# Patient Record
Sex: FEMALE | Race: BLACK OR AFRICAN AMERICAN | NOT HISPANIC OR LATINO | Employment: STUDENT | URBAN - METROPOLITAN AREA
[De-identification: names, ages, dates, MRNs, and addresses within clinical notes are randomized per-mention and may not be internally consistent; named-entity substitution may affect disease eponyms.]

---
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
## 2024-04-15 ENCOUNTER — PATIENT MESSAGE (OUTPATIENT)
Dept: PEDIATRICS | Facility: CLINIC | Age: 19
End: 2024-04-15
Payer: MEDICAID

## 2025-06-26 ENCOUNTER — OFFICE VISIT (OUTPATIENT)
Dept: URGENT CARE | Facility: CLINIC | Age: 20
End: 2025-06-26
Payer: MEDICAID

## 2025-06-26 VITALS
BODY MASS INDEX: 36.82 KG/M2 | SYSTOLIC BLOOD PRESSURE: 99 MMHG | HEIGHT: 65 IN | TEMPERATURE: 97 F | WEIGHT: 221 LBS | OXYGEN SATURATION: 99 % | DIASTOLIC BLOOD PRESSURE: 65 MMHG | HEART RATE: 78 BPM | RESPIRATION RATE: 17 BRPM

## 2025-06-26 DIAGNOSIS — L60.0 INGROWN TOENAIL OF RIGHT FOOT: Primary | ICD-10-CM

## 2025-06-26 PROCEDURE — 99204 OFFICE O/P NEW MOD 45 MIN: CPT | Mod: S$GLB,,, | Performed by: NURSE PRACTITIONER

## 2025-06-26 RX ORDER — CEPHALEXIN 500 MG/1
500 CAPSULE ORAL EVERY 12 HOURS
Qty: 14 CAPSULE | Refills: 0 | Status: SHIPPED | OUTPATIENT
Start: 2025-06-26 | End: 2025-07-03

## 2025-06-26 RX ORDER — TOBRAMYCIN 3 MG/ML
SOLUTION/ DROPS OPHTHALMIC
Qty: 5 ML | Refills: 0 | Status: SHIPPED | OUTPATIENT
Start: 2025-06-26

## 2025-06-26 NOTE — PROGRESS NOTES
"Subjective:      Patient ID: Johanna Huntley is a 20 y.o. female.    Vitals:  height is 5' 5" (1.651 m) and weight is 100.2 kg (221 lb). Her temperature is 97.1 °F (36.2 °C). Her blood pressure is 99/65 and her pulse is 78. Her respiration is 17 and oxygen saturation is 99%.     Chief Complaint: Toe Pain    Pt is here for toe pain that onset Monday. Pt states pain is 7/10. She has tried ibuprofen. Pt states it was caused by hitting her toe.     Provider note begins below    Patient reports right great toe hurting on Sunday.  She cut down the toenail on Tuesday.  She has had purulent discharge and pain since then.  She is also requesting some assistance with her ADHD medicine Vyvanse.  The pharmacy has been out of it.    Toe Pain   The incident occurred 3 to 5 days ago. The incident occurred at home. The injury mechanism was a direct blow. The pain is at a severity of 7/10. The pain has been Constant since onset. Associated symptoms include an inability to bear weight. Associated symptoms comments: Redness and swelling. . She reports no foreign bodies present. The symptoms are aggravated by palpation and weight bearing. Treatments tried: ibuprofen.     ROS   Objective:     Physical Exam   Constitutional: She is oriented to person, place, and time.   HENT:   Head: Normocephalic and atraumatic.   Cardiovascular: Normal rate.   Pulmonary/Chest: Effort normal. No respiratory distress.   Abdominal: Normal appearance.   Musculoskeletal:      Right foot: Right great toe: Exhibits tenderness and swelling.        Feet:    Neurological: She is alert and oriented to person, place, and time.   Skin: Skin is warm and dry.   Psychiatric: Her behavior is normal. Mood normal.           Assessment:     1. Ingrown toenail of right foot        Plan:   Unable to fill medication at this time  Recommend transferring medication to a different pharmacy   Tobramycin and nail bed   Warm Epson salt soaks   Keflex   Follow up with " podiatry          Ingrown toenail of right foot  -     tobramycin sulfate 0.3% (TOBREX) 0.3 % ophthalmic solution; 1 drop to right nailbed 2 x per day for 7 days  Dispense: 5 mL; Refill: 0  -     cephALEXin (KEFLEX) 500 MG capsule; Take 1 capsule (500 mg total) by mouth every 12 (twelve) hours. for 7 days  Dispense: 14 capsule; Refill: 0  -     Ambulatory referral/consult to Podiatry

## 2025-07-02 ENCOUNTER — TELEPHONE (OUTPATIENT)
Dept: PODIATRY | Facility: CLINIC | Age: 20
End: 2025-07-02
Payer: MEDICAID

## 2025-07-02 NOTE — TELEPHONE ENCOUNTER
Patient called to schedule an appointment for a ingrown toenail to the right great toenail. Her appointment date for 8/6 @ 10:15 am

## 2025-08-06 ENCOUNTER — TELEPHONE (OUTPATIENT)
Dept: PODIATRY | Facility: CLINIC | Age: 20
End: 2025-08-06

## 2025-08-06 ENCOUNTER — OFFICE VISIT (OUTPATIENT)
Dept: PODIATRY | Facility: CLINIC | Age: 20
End: 2025-08-06
Payer: MEDICAID

## 2025-08-06 DIAGNOSIS — L60.0 INGROWN NAIL: Primary | ICD-10-CM

## 2025-08-06 PROCEDURE — 99213 OFFICE O/P EST LOW 20 MIN: CPT | Mod: PBBFAC,PN | Performed by: PODIATRIST

## 2025-08-06 PROCEDURE — 11765 WEDGE EXCISION SKN NAIL FOLD: CPT | Mod: PBBFAC,PN | Performed by: PODIATRIST

## 2025-08-06 PROCEDURE — 99999 PR PBB SHADOW E&M-EST. PATIENT-LVL III: CPT | Mod: PBBFAC,,, | Performed by: PODIATRIST

## 2025-08-06 NOTE — PATIENT INSTRUCTIONS
Instructions for Care after Ingrown Nail removal    General Information: Stay off your feet as much as possible today. You may wear a surgical shoe, sandal or any open toed shoe that does not squeeze, constrict or put pressure on your toe(s). Your toe(s) may remain numb for up to 2-24 hours after the procedure. Although most patients can wear a closed loose fitting shoe after the first week, the toe will heal faster the more you use the open toed shoe in the first 2-3  weeks. Please contact our office if you have any questions or concerns.    Bleeding: Slight bleeding, discoloration and drainage are normal. Due to the chemical used there may be some yellow-clear drainage coming from the toe for 2-3 weeks.    Discomfort: You can elevate your foot to help alleviate minor swelling, bleeding and discomfort. You may also take Advil, Tylenol or other over the counter pain medications to help alleviate pain. Call our office if the pain is not well controlled. Most patients have very little discomfort as long as they minimize their walking for the first 24 hours and do not bump the toe.    Removing the Bandage: Starting the day after the procedure, carefully remove the dressing and shower or bathe as normal. It is Ok to get the bandage soaking wet in the shower and when you remove it, it should not stick to the surgery site.    Dressing Options- Traditional Method:  1. Soaking two times a day in WARM water with Epsom salts or diluted Povidone Iodine  (Betadine) for 15-20 minutes. You will need to purchase these products from the pharmacy.  2. Dry toe then apply an antibiotic cream or ointment such as Neosporin or Polysporin plus or  Garamycin and cover with a 2 x 2 inch size gauze and then secure with a 1 inch band aid.  3. In the second week, take the dressing off at bedtime to air dry the toe.  4. If the toe is infected take the Antibiotic Pills as directed until finished.      Ingrown Toenail        What Is an Ingrown  Toenail?    When a toenail is ingrown, it is curved and grows into the skin, usually at the nail borders (the sides of the nail). This digging in of the nail irritates the skin, often creating pain, redness, swelling and warmth in the toe.    If an ingrown nail causes a break in the skin, bacteria may enter and cause an infection in the area, which is often marked by drainage and a foul odor. However, even if the toe is not painful, red, swollen or warm, a nail that curves downward into the skin can progress to an infection.    Ingrown toenail and normal toenail    Causes  Causes of ingrown toenails include:    Heredity. In many people, the tendency for ingrown toenails is inherited.  Trauma. Sometimes an ingrown toenail is the result of trauma, such as stubbing your toe, having an object fall on your toe or engaging in activities that involve repeated pressure on the toes, such as kicking or running.  Improper trimming. The most common cause of ingrown toenails is cutting your nails too short. This encourages the skin next to the nail to fold over the nail.   Improperly sized footwear. Ingrown toenails can result from wearing socks and shoes that are tight or short.  Nail conditions. Ingrown toenails can be caused by nail problems, such as fungal infections or losing a nail due to trauma.     Treatment  Sometimes initial treatment for ingrown toenails can be safely performed at home. However, home treatment is strongly discouraged if an infection is suspected or for those who have medical conditions that put feet at high risk, such as diabetes, nerve damage in the foot or poor circulation.        Ingrown toenail before and after treatment    Home Care  If you do not have an infection or any of the above medical conditions, you can soak your foot in room-temperature water (adding Epsom salt may be recommended by your doctor) and gently massage the side of the nail fold to help reduce the inflammation.    Avoid  attempting bathroom surgery. Repeated cutting of the nail can cause the condition to worsen over time. If your symptoms fail to improve, it is time to see a foot and ankle surgeon.    Physician Care  After examining the toe, the foot and ankle surgeon will select the treatment best suited for you. If an infection is present, an oral antibiotic may be prescribed.    Sometimes a minor surgical procedure, often performed in the office, will ease the pain and remove the offending nail. After applying a local anesthetic, the doctor removes part of the nails side border. Some nails may become ingrown again, requiring removal of the nail root.    Following the nail procedure, a light bandage will be applied. Most people experience very little pain after surgery and may resume normal activity the next day. If your surgeon has prescribed an oral antibiotic, be sure to take all the medication, even if your symptoms have improved.    Preventing Ingrown Toenails  Many cases of ingrown toenails may be prevented by:    Proper trimming. Cut toenails in a fairly straight line, and do not cut them too short. You should be able to get your fingernail under the sides and end of the nail.  Well-fitting shoes and socks. Do not wear shoes that are short or tight in the toe area. Avoid shoes that are loose because they too cause pressure on the toes, especially when running or walking briskly.               What You Should Know About Home Treatment  Do not cut a notch in the nail. Contrary to what some people believe, this does not reduce the tendency for the nail to curve downward.  Do not repeatedly trim nail borders. Repeated trimming does not change the way the nail grows and can make the condition worse.  Do not place cotton under the nail. Not only does this not relieve the pain, it provides a place for harmful bacteria to grow, resulting in infection.  Over-the-counter medications are ineffective. Topical medications may mask  the pain, but they do not correct the underlying problem.        Understanding Ingrown Toenails    An ingrown nail is the result of a nail growing into the skin that surrounds it. This often occurs at either edge of the big toe. Ingrown nails may be caused by improper trimming, inherited nail deformities, injuries, fungal infections, or pressure.  Symptoms  Ingrown nails may cause pain at the tip of the toe or all the way to the base of the toe. The pain is often worse while walking. An ingrown nail may also lead to infection, inflammation, or a more serious condition. If its infected, you might see pus or redness.  Evaluation  To determine the extent of your problem, your healthcare provider examines and possibly presses the painful area. If other problems are suspected, blood tests, cultures, and X-rays may be done as well.  Treatment  If the nail isnt infected, your healthcare provider may trim the corner of it to help relieve your symptoms. He or she may need to remove one side of your nail back to the cuticle. The base of the nail may then be treated with a chemical to keep the ingrown part from growing back. Severe infections or ingrown nails may require antibiotics and temporary or permanent removal of a portion of the nail. To prevent pain, a local anesthetic may be used in these procedures. This treatment is usually done at your healthcare provider's office.  Prevention  Many nail problems can be prevented by wearing the right shoes and trimming your nails properly. To help avoid infection, keep your feet clean and dry. If you have diabetes, talk with your healthcare provider before doing any foot self-care.  The right shoes: Get your feet measured (your size may change as you age). Wear shoes that are supportive and roomy enough for your toes to wiggle. Look for shoes made of natural materials such as leather, which allow your feet to breathe.  Proper trimming: To avoid problems, trim your toenails  straight across without cutting down into the corners. If you cant trim your own nails, ask your healthcare provider to do so for you.  Date Last Reviewed: 10/1/2016  © 7773-9654 [employer]. [address] [place] [address], [place] [address]. All rights reserved. This information is not intended as a substitute for professional medical care. Always follow your healthcare professional's instructions.

## 2025-08-06 NOTE — PROGRESS NOTES
[facility]  [facility]  [address]  [address]  [place] [address]  Dept: [phone]  Dept Fax: [phone]    Evan Clayton Jr., DPM     Assessment:   JORGE LUIS    Coding  1. Ingrown nail - Right Foot  Nail Removal          Plan:     Nail Removal    Date/Time: 8/6/2025 10:15 AM    Performed by: Evan Clayton Jr., DPM  Authorized by: Evan Clayton Jr., DPM    Consent Done?:  Yes (Verbal)  Time out: Immediately prior to the procedure a time out was called    Prep: patient was prepped and draped in usual sterile fashion    Location:     Location:  Right foot    Location detail:  Right big toe  Anesthesia:     Anesthesia:  Digital block    Local anesthetic:  Bupivacaine 0.5% without epinephrine    Anesthetic total (ml):  4  Procedure Details:     Preparation:  Skin prepped with alcohol, skin prepped with Betadine and sterile field established    Amount removed:  Partial    Side:  Medial    Wedge excision of skin of nail fold: Yes      Nail bed sutured?: No      Nail matrix removed:  None    Removed nail replaced and anchored: No      Dressing applied:  4x4, antibiotic ointment, gauze roll, petrolatum-impregnated gauze and dressing applied    Patient tolerance:  Patient tolerated the procedure well with no immediate complications      Johanna was seen today for toe pain.    Diagnoses and all orders for this visit:    Ingrown nail - Right Foot  -     Nail Removal        -pt seen, evaluated, and managed  -dx discussed in detail. All questions/concerns addressed  -all tx options discussed. All alternatives, risks, benefits of all txs discussed  -The patient was educated regarding the above diagnosis.   -discussed ingrowing toenails and all tx options. Pt opts for partial nail avulsion  -pt to perform epsom salt or betadine soaks once daily x 2wks. Written instructions dispensed  -keep toe covered with triple abx ointment + bandaid x 2wks      Rx dispensed: none  Referrals: none  -WB:  wbat      Follow up in about 4 weeks (around 9/3/2025).    Subjective:      Patient ID: Johanna Huntley is a 20 y.o. female.    Chief Complaint:   Chief Complaint   Patient presents with    Toe Pain     Right        CC - ingrown nail: Patient presents to the clinic complaining of painful ingrown toenail on the right foot. Patients rates pain 2/10 on pain scale. patient seeking tx options.    Toe Pain         Last Podiatry Enc: Visit date not found  Last Enc w/ Me: Visit date not found    Outside reports reviewed: historical medical records.  Family hx: as below  Past Medical History:   Diagnosis Date    ADHD     Anxiety disorder, unspecified     Iron deficiency anemia, unspecified      No past surgical history on file.  Family History   Problem Relation Name Age of Onset    Cancer Mother      Breast cancer Mother      Colon cancer Neg Hx      Ovarian cancer Neg Hx       Current Medications[1]  Review of patient's allergies indicates:  No Known Allergies  Social History[2]    ROS    REVIEW OF SYSTEMS: Negative as documented below as well as positive findings in bold.       Constitutional  Respiratory  Gastrointestinal  Skin   - Fever - Cough - Heartburn - Rash   - Chills - Spit blood - Nausea - Itching   - Weight Loss - Shortness of breath - Vomiting - Nail pain   - Malaise/Fatigue - Wheezing - Abdominal Pain  Wound/Ulcer   - Weight Gain   - Blood in Stool  Poor wound healing       - Diarrhea          Cardiovascular  Genitourinary  Neurological  HEENT   - Chest Pain - Dysuria - Burning Sensation of feet - Headache   - Palpitations - Hematuria - Tingling / Paresthesia - Congestion   - Pain at night in legs - Flank Pain - Dizziness - Sore Throat   - Cramping   - Tremor - Blurred Vision   - Leg Swelling   - Sensory Change - Double Vision   - Dizzy when standing   - Speech Change - Eye Redness       - Focal Weakness - Dry Eyes       - Loss of Consciousness          Endocrine  Musculoskeletal  Psychiatric   - Cold  intolerance - Muscle Pain - Depression   - Heat intolerance - Neck Pain - Insomnia   - Anemia - Joint Pain - Memory Loss   -  Easy bruising, bleeding - Heel pain - Anxiety      Toe Pain        Leg/Ankle/Foot Pain         Objective:     There were no vitals taken for this visit.  There were no vitals filed for this visit.    Physical Exam    General Appearance:   Patient appears well developed, well nourished  Patient appears stated age    Psychiatric:   Patient is oriented to time, place, and person.  Patient has appropriate mood and affect    Neck:  Trachea Midline  No visible masses    Respiratory/Ears:  No distress or labored breathing.  Able to differentiate between normal talking voice and whisper.  Able to follow commands    Eyes:  Visual Acuity intact  Lids and conjunctivae normal. No discoloration noted.    Foot Exam  Physical Exam  Ortho Exam  Ortho/SPM Exam  Physical Exam  Neurological Exam    R LE exam con't:  V:  DP 2/4, PT 2/4   CRT< 3s to all digits tested   Tibial and popliteal lymph nodes are w/o abnormality      N:  Patient displays normal ankle reflexes   SILT in SP/DP/T/Gabriel/Saph distributions    Ortho: +Motor EHL/FHL/TA/GA   +TTP R great toe  Compartments soft/compressible. No pain on passive stretch of big toe. No calf  Pain.    Derm:  skin intact, skin warm and dry, skin without ulcers or lesions, skin without induration, right, great toe ingrowing and incurvated medial fold    Imaging / Labs:      No results found.      Note: This was dictated using a computer transcription program. Although proofread, it may contain computer transcription errors and phonetic errors. Other human proofreading errors may also exist. Corrections may be performed at a later time. Please contact us for any clarification if needed.    Evan Clayton DPM  [facility]         [1]   Current Outpatient Medications   Medication Sig Dispense Refill    acetaminophen (TYLENOL) 500 MG tablet  Take 1 tablet (500 mg total) by mouth every 6 (six) hours as needed. 30 tablet 0    ferrous sulfate (FEOSOL) 325 mg (65 mg iron) Tab tablet Take 1 tablet (325 mg total) by mouth daily with breakfast. 30 tablet 2    fluticasone propionate (FLONASE) 50 mcg/actuation nasal spray 1 spray (50 mcg total) by Each Nostril route 2 (two) times daily. 16 g 0    ibuprofen (ADVIL,MOTRIN) 600 MG tablet Take 1 tablet (600 mg total) by mouth every 6 (six) hours as needed for Pain (Take with food as needed for mild-to-moderate pain). 20 tablet 0    ipratropium (ATROVENT) 21 mcg (0.03 %) nasal spray 2 sprays by Each Nostril route 3 (three) times daily. 30 mL 11    levonorgestrel-ethinyl estradiol (SEASONALE) 0.15 mg-30 mcg (91) per tablet Take 1 tablet by mouth.      lisdexamfetamine (VYVANSE) 40 MG Cap Take 1 capsule by mouth every morning for 30 days. 30 capsule 0    promethazine-dextromethorphan (PROMETHAZINE-DM) 6.25-15 mg/5 mL Syrp Take 5 mLs by mouth every 6 (six) hours as needed.      sodium chloride (SALINE NASAL) 0.65 % nasal spray 1 spray by Nasal route as needed for Congestion. 30 mL 0    tobramycin sulfate 0.3% (TOBREX) 0.3 % ophthalmic solution 1 drop to right nailbed 2 x per day for 7 days 5 mL 0    triamcinolone acetonide 0.025% (KENALOG) 0.025 % cream Apply topically 2 (two) times daily. To rash on feet 30 g 0    cetirizine (ZYRTEC) 10 MG tablet Take 1 tablet (10 mg total) by mouth once daily. 30 tablet 1    levocetirizine (XYZAL) 5 MG tablet Take 1 tablet (5 mg total) by mouth every evening. (Patient not taking: Reported on 9/15/2023) 30 tablet 0    nystatin-triamcinolone (MYCOLOG II) cream Apply topically 2 (two) times daily. for 7 days 30 g 0     No current facility-administered medications for this visit.   [2]   Social History  Socioeconomic History    Marital status: Single   Tobacco Use    Smoking status: Never     Passive exposure: Never    Smokeless tobacco: Never   Substance and Sexual Activity    Alcohol  use: No    Drug use: No    Sexual activity: Yes     Partners: Male     Birth control/protection: Condom, OCP   Social History Narrative    Lives with mother, father, sister. No smokers. 1 cat. 7th grade.     Social Drivers of Health     Financial Resource Strain: Low Risk  (1/13/2025)    [facility]    Overall Financial Resource Strain (CARDIA)     Difficulty of Paying Living Expenses: Not hard at all   Food Insecurity: No Food Insecurity (1/13/2025)    [facility]    Hunger Vital Sign     Worried About Running Out of Food in the Last Year: Never true     Ran Out of Food in the Last Year: Never true   Transportation Needs: No Transportation Needs (1/13/2025)    [facility]    PRAPARE - Transportation     Lack of Transportation (Medical): No     Lack of Transportation (Non-Medical): No   Physical Activity: Sufficiently Active (1/13/2025)    [facility]    Exercise Vital Sign     Days of Exercise per Week: 5 days     Minutes of Exercise per Session: 30 min   Stress: Stress Concern Present (1/13/2025)    [facility]    [employer] - Occupational Stress Questionnaire     Feeling of Stress : Rather much   Housing Stability: Low Risk  (1/13/2025)    [facility]    Housing Stability Vital Sign     Unable to Pay for Housing in the Last Year: No     Number of Times Moved in the Last Year: 0     Homeless in the Last Year: No